# Patient Record
Sex: FEMALE | Race: BLACK OR AFRICAN AMERICAN | NOT HISPANIC OR LATINO | Employment: FULL TIME | URBAN - METROPOLITAN AREA
[De-identification: names, ages, dates, MRNs, and addresses within clinical notes are randomized per-mention and may not be internally consistent; named-entity substitution may affect disease eponyms.]

---

## 2023-05-09 LAB
ALANINE AMINOTRANSFERASE (SGPT) (U/L) IN SER/PLAS: 13 U/L (ref 7–45)
ALBUMIN (G/DL) IN SER/PLAS: 4 G/DL (ref 3.4–5)
ALKALINE PHOSPHATASE (U/L) IN SER/PLAS: 62 U/L (ref 33–110)
ANION GAP IN SER/PLAS: 11 MMOL/L (ref 10–20)
ASPARTATE AMINOTRANSFERASE (SGOT) (U/L) IN SER/PLAS: 13 U/L (ref 9–39)
BILIRUBIN DIRECT (MG/DL) IN SER/PLAS: 0.1 MG/DL (ref 0–0.3)
BILIRUBIN TOTAL (MG/DL) IN SER/PLAS: 0.4 MG/DL (ref 0–1.2)
CALCIDIOL (25 OH VITAMIN D3) (NG/ML) IN SER/PLAS: 35 NG/ML
CALCIUM (MG/DL) IN SER/PLAS: 9.5 MG/DL (ref 8.6–10.6)
CARBON DIOXIDE, TOTAL (MMOL/L) IN SER/PLAS: 29 MMOL/L (ref 21–32)
CHLORIDE (MMOL/L) IN SER/PLAS: 106 MMOL/L (ref 98–107)
CHOLESTEROL (MG/DL) IN SER/PLAS: 211 MG/DL (ref 0–199)
CHOLESTEROL IN HDL (MG/DL) IN SER/PLAS: 67.6 MG/DL
CHOLESTEROL/HDL RATIO: 3.1
CREATININE (MG/DL) IN SER/PLAS: 0.7 MG/DL (ref 0.5–1.05)
GFR FEMALE: >90 ML/MIN/1.73M2
GLUCOSE (MG/DL) IN SER/PLAS: 87 MG/DL (ref 74–99)
LDL: 119 MG/DL (ref 0–99)
POTASSIUM (MMOL/L) IN SER/PLAS: 4.6 MMOL/L (ref 3.5–5.3)
PROTEIN TOTAL: 6.8 G/DL (ref 6.4–8.2)
SODIUM (MMOL/L) IN SER/PLAS: 141 MMOL/L (ref 136–145)
THYROTROPIN (MIU/L) IN SER/PLAS BY DETECTION LIMIT <= 0.05 MIU/L: 1.08 MIU/L (ref 0.44–3.98)
TRIGLYCERIDE (MG/DL) IN SER/PLAS: 122 MG/DL (ref 0–149)
UREA NITROGEN (MG/DL) IN SER/PLAS: 12 MG/DL (ref 6–23)
VLDL: 24 MG/DL (ref 0–40)

## 2023-05-12 ENCOUNTER — TELEPHONE (OUTPATIENT)
Dept: PRIMARY CARE | Facility: CLINIC | Age: 33
End: 2023-05-12

## 2023-05-12 NOTE — TELEPHONE ENCOUNTER
----- Message from Laurita Ureña MD sent at 5/12/2023  3:09 PM EDT -----  All the labs were good except high cholesterol at 211. Please mail her a low fat diet.

## 2023-09-21 LAB
CHLAMYDIA TRACH., AMPLIFIED: NEGATIVE
N. GONORRHEA, AMPLIFIED: NEGATIVE
TRICHOMONAS VAGINALIS: NEGATIVE

## 2023-10-05 PROBLEM — A59.01 TRICHOMONAS VAGINITIS: Status: RESOLVED | Noted: 2023-10-05 | Resolved: 2023-10-05

## 2023-10-05 PROBLEM — R87.613 HGSIL ON PAP SMEAR OF CERVIX: Status: ACTIVE | Noted: 2023-10-05

## 2023-10-05 PROBLEM — J30.9 ALLERGIC RHINITIS: Status: ACTIVE | Noted: 2023-10-05

## 2023-10-05 PROBLEM — T83.32XA IUD THREADS LOST: Status: RESOLVED | Noted: 2023-10-05 | Resolved: 2023-10-05

## 2023-10-05 PROBLEM — I10 ESSENTIAL HYPERTENSION: Status: ACTIVE | Noted: 2023-10-05

## 2023-10-05 PROBLEM — F41.9 ANXIETY: Status: ACTIVE | Noted: 2023-10-05

## 2023-10-05 PROBLEM — N93.9 ABNORMAL UTERINE BLEEDING (AUB): Status: ACTIVE | Noted: 2023-10-05

## 2023-10-05 PROBLEM — E04.1 THYROID NODULE: Status: RESOLVED | Noted: 2023-10-05 | Resolved: 2023-10-05

## 2023-12-21 ENCOUNTER — OFFICE VISIT (OUTPATIENT)
Dept: PRIMARY CARE | Facility: CLINIC | Age: 33
End: 2023-12-21
Payer: COMMERCIAL

## 2023-12-21 VITALS
DIASTOLIC BLOOD PRESSURE: 90 MMHG | HEIGHT: 66 IN | BODY MASS INDEX: 45.48 KG/M2 | OXYGEN SATURATION: 100 % | TEMPERATURE: 97.2 F | HEART RATE: 92 BPM | SYSTOLIC BLOOD PRESSURE: 135 MMHG | WEIGHT: 283 LBS

## 2023-12-21 DIAGNOSIS — M54.50 CHRONIC BILATERAL LOW BACK PAIN WITHOUT SCIATICA: ICD-10-CM

## 2023-12-21 DIAGNOSIS — I10 ESSENTIAL HYPERTENSION: ICD-10-CM

## 2023-12-21 DIAGNOSIS — E66.01 CLASS 3 SEVERE OBESITY DUE TO EXCESS CALORIES WITH SERIOUS COMORBIDITY AND BODY MASS INDEX (BMI) OF 45.0 TO 49.9 IN ADULT (MULTI): Primary | ICD-10-CM

## 2023-12-21 DIAGNOSIS — G89.29 CHRONIC BILATERAL LOW BACK PAIN WITHOUT SCIATICA: ICD-10-CM

## 2023-12-21 DIAGNOSIS — J30.1 ALLERGIC RHINITIS DUE TO POLLEN, UNSPECIFIED SEASONALITY: ICD-10-CM

## 2023-12-21 PROBLEM — N93.9 ABNORMAL UTERINE BLEEDING (AUB): Status: RESOLVED | Noted: 2023-10-05 | Resolved: 2023-12-21

## 2023-12-21 PROBLEM — E66.813 CLASS 3 SEVERE OBESITY DUE TO EXCESS CALORIES WITH SERIOUS COMORBIDITY AND BODY MASS INDEX (BMI) OF 45.0 TO 49.9 IN ADULT: Status: ACTIVE | Noted: 2023-12-21

## 2023-12-21 PROBLEM — E55.9 VITAMIN D DEFICIENCY: Status: ACTIVE | Noted: 2023-12-21

## 2023-12-21 PROBLEM — R87.613 HGSIL ON PAP SMEAR OF CERVIX: Status: RESOLVED | Noted: 2023-10-05 | Resolved: 2023-12-21

## 2023-12-21 PROBLEM — Z97.5 IUD (INTRAUTERINE DEVICE) IN PLACE: Status: ACTIVE | Noted: 2023-12-21

## 2023-12-21 PROCEDURE — 3080F DIAST BP >= 90 MM HG: CPT | Performed by: PEDIATRICS

## 2023-12-21 PROCEDURE — 3075F SYST BP GE 130 - 139MM HG: CPT | Performed by: PEDIATRICS

## 2023-12-21 PROCEDURE — 3008F BODY MASS INDEX DOCD: CPT | Performed by: PEDIATRICS

## 2023-12-21 PROCEDURE — 1036F TOBACCO NON-USER: CPT | Performed by: PEDIATRICS

## 2023-12-21 PROCEDURE — 99214 OFFICE O/P EST MOD 30 MIN: CPT | Performed by: PEDIATRICS

## 2023-12-21 RX ORDER — AMLODIPINE BESYLATE 5 MG/1
5 TABLET ORAL DAILY
Qty: 90 TABLET | Refills: 0 | Status: SHIPPED | OUTPATIENT
Start: 2023-12-21

## 2023-12-21 RX ORDER — FLUTICASONE PROPIONATE 50 MCG
SPRAY, SUSPENSION (ML) NASAL
COMMUNITY
Start: 2021-03-16 | End: 2023-12-21 | Stop reason: SDUPTHER

## 2023-12-21 RX ORDER — AMLODIPINE BESYLATE 5 MG/1
5 TABLET ORAL DAILY
COMMUNITY
Start: 2019-03-21 | End: 2023-12-21 | Stop reason: SDUPTHER

## 2023-12-21 RX ORDER — FLUTICASONE PROPIONATE 50 MCG
SPRAY, SUSPENSION (ML) NASAL
Qty: 16 G | Refills: 0 | Status: SHIPPED | OUTPATIENT
Start: 2023-12-21

## 2023-12-21 RX ORDER — LORATADINE 10 MG/1
10 TABLET ORAL DAILY
Qty: 90 TABLET | Refills: 0 | Status: SHIPPED | OUTPATIENT
Start: 2023-12-21

## 2023-12-21 RX ORDER — LORATADINE 10 MG/1
1 TABLET ORAL DAILY
COMMUNITY
Start: 2021-03-16 | End: 2023-12-21 | Stop reason: SDUPTHER

## 2023-12-21 ASSESSMENT — PROMIS GLOBAL HEALTH SCALE
RATE_MENTAL_HEALTH: FAIR
RATE_AVERAGE_FATIGUE: MODERATE
RATE_GENERAL_HEALTH: FAIR
RATE_SOCIAL_SATISFACTION: FAIR
RATE_PHYSICAL_HEALTH: POOR
CARRYOUT_SOCIAL_ACTIVITIES: FAIR
CARRYOUT_PHYSICAL_ACTIVITIES: MODERATELY
EMOTIONAL_PROBLEMS: SOMETIMES
RATE_QUALITY_OF_LIFE: FAIR
RATE_AVERAGE_PAIN: 3

## 2023-12-21 NOTE — PATIENT INSTRUCTIONS
Avoid alcohol,  sausage, candy bar, chips, fried foods and restaurant food;   Cardio half hour to an hour daily; Carmen Cardenas exercise video  [email or web address]
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
## 2024-02-19 ENCOUNTER — APPOINTMENT (OUTPATIENT)
Dept: PRIMARY CARE | Facility: CLINIC | Age: 34
End: 2024-02-19
Payer: COMMERCIAL

## 2024-04-18 ENCOUNTER — APPOINTMENT (OUTPATIENT)
Dept: ENDOCRINOLOGY | Facility: CLINIC | Age: 34
End: 2024-04-18
Payer: COMMERCIAL

## 2024-05-02 ENCOUNTER — TELEMEDICINE (OUTPATIENT)
Dept: ENDOCRINOLOGY | Facility: CLINIC | Age: 34
End: 2024-05-02
Payer: COMMERCIAL

## 2024-05-02 DIAGNOSIS — E66.01 CLASS 3 SEVERE OBESITY DUE TO EXCESS CALORIES WITH SERIOUS COMORBIDITY AND BODY MASS INDEX (BMI) OF 45.0 TO 49.9 IN ADULT (MULTI): ICD-10-CM

## 2024-05-02 PROCEDURE — 1036F TOBACCO NON-USER: CPT | Performed by: NURSE PRACTITIONER

## 2024-05-02 PROCEDURE — 99204 OFFICE O/P NEW MOD 45 MIN: CPT | Performed by: NURSE PRACTITIONER

## 2024-05-02 PROCEDURE — 3008F BODY MASS INDEX DOCD: CPT | Performed by: NURSE PRACTITIONER

## 2024-05-02 NOTE — PATIENT INSTRUCTIONS
Get fasting labs      Based on labs, call insurance and ask coverage of Wegovy or Zepbound      Focus on meal replacement bar or shake for breakfast      Set up appt with Josy our dietician      Follow up 3 months virtually, 6 months in person

## 2024-05-02 NOTE — Clinical Note
Patient seen virtually today.  Can you please arrange 3 month weight follow up with me virtually and 6 month in person?  Can you please schedule weight visit with Josy?  Thanks.  Courtney

## 2024-05-02 NOTE — PROGRESS NOTES
Subjective   Marine Cobb is a 34 y.o. female who presents for medical evaluation of non-surgical weight management.       I am seeing this patient in consultation as per referral by Dr. Laurita Ureña for  evaluation of her obesity and associated medical problems.     Overweight: Weight started to be of concern late teens.  Rate of weight gain is described as gradual. Family history positive for obesity in the patient's mother, maternal grandmother.  She considers ideal weight to be 170 lbs. Weight at graduation from high school 179 lbs. Weight when  low 200s. Previous treatments include:  at the gym, calorie restriction, proper portion size.   Factors associated with weight gain: lack of time, portion sizes, and limited exercise        She reports weight 263 lbs at age 16-17  179 lbs at high school graduation after an intensive work out plan and diet plan  Normal weight recently had been in the mid 200s  Went back to college and now with kids,  has gained more weight   Now at highest weight 283 lbs  She is not sure she would like to proceed with bariatric surgery   Would like to lose on her own at this time     DIET:   Breakfast:  Nestle carnation breakfast, or whey powder protein shake   or skips  Lunch: consistent meal for her.  [place] or chicken salad or full course meal    Dinner: at home or eating out.  Protein, carb, veggie   Snacks: denies   Drinks: coffee (black with oat milk creamer), water, hot tea with honey or lemon juice    Alcohol: wine, cocktails - 5 drinks per week      EXERCISE:  Patient is doing day to day activity   Trying to get 10,000 steps per day - 5-7 days reaching this goal       QUALITY OF SLEEP PER NIGHT:  Patient get 5-7 hours of sleep on average per night.  Patient has not been diagnosed DECLAN.  Needs to get a sleep study.       LEVEL OF STRESS:  Mild- Moderate related to life, work      APPETITE CONTROL:   Controlled  Not always hungry when she is hungry she will eat      ROS  General: no fever, chills.  Weight see HPI  Skin: no rashes, pruritis or dry skin  Cardiac: denies chest pain, heart palpitations or orthopnea  Pulmonary: denies wheezing, productive cough or exertional dyspnea      Objective    Physical Exam  Unable to obtain blood pressure today due to virtual visit  General Appearance: Well appearing, alert, in no acute distress, well-hydrated, well nourished.  Affect: alert, cooperative         Current Outpatient Medications:     amLODIPine (Norvasc) 5 mg tablet, Take 1 tablet (5 mg) by mouth once daily., Disp: 90 tablet, Rfl: 0    fluticasone (Flonase) 50 mcg/actuation nasal spray, Take one daily, Disp: 16 g, Rfl: 0    levonorgestreL (LILETTA) 20.4 mcg/24 hrs (8 yrs) 52 mg intrauterine device, by intrauterine route., Disp: , Rfl:     loratadine (Claritin) 10 mg tablet, Take 1 tablet (10 mg) by mouth once daily., Disp: 90 tablet, Rfl: 0    Assessment/Plan   Morbid obesity BMI > 40:  HTN:   -BMI not at goal.  She is at her highest weight.  She reports to busy lifestyle working and raising the family.  She is not interested in bariatric surgery and would like to try medical weight loss.  She is interested in a once weekly GLP-1 or GLP-1/GIP.  Has pending labs and would evaluate A1c.  If elevated would proceed with medications for type 2 diabetes however if not would have to look into coverage for Wegovy and Zepbound.  Patient works for [employer] and these medications are not covered on their plan.  Could consider phentermine but patient reports she is not good at taking medication on a daily basis.  Also has high blood pressure and would need to make sure that this was in goal if we started phentermine.  Could consider Contrave but unclear if cost prohibitive  She is willing to meet with dietitian and not interested and that group class at this time.      Plan:   Get fasting labs    Based on labs, call insurance and ask coverage of Wegovy or Zepbound    Focus on  meal replacement bar or shake for breakfast    Set up appt with Josy our dietician    Follow up 3 months virtually, 6 months in person

## 2024-08-15 ENCOUNTER — APPOINTMENT (OUTPATIENT)
Dept: ENDOCRINOLOGY | Facility: CLINIC | Age: 34
End: 2024-08-15
Payer: COMMERCIAL

## 2024-08-15 VITALS — BODY MASS INDEX: 41.28 KG/M2 | WEIGHT: 255.73 LBS

## 2024-08-15 DIAGNOSIS — E66.01 OBESITY, CLASS III, BMI 40-49.9 (MORBID OBESITY) (MULTI): Primary | ICD-10-CM

## 2024-08-15 PROCEDURE — 99213 OFFICE O/P EST LOW 20 MIN: CPT | Performed by: NURSE PRACTITIONER

## 2024-08-15 PROCEDURE — 1036F TOBACCO NON-USER: CPT | Performed by: NURSE PRACTITIONER

## 2024-08-15 NOTE — PROGRESS NOTES
Subjective   Marine Cobb is a 34 y.o. female who presents for a follow up of non-surgical weight management.       Overweight: Weight started to be of concern late teens.  Rate of weight gain is described as gradual. Family history positive for obesity in the patient's mother, maternal grandmother.  She considers ideal weight to be 170 lbs. Weight at graduation from high school 179 lbs. Weight when  low 200s. Previous treatments include:  at the gym, calorie restriction, proper portion size.   Factors associated with weight gain: lack of time, portion sizes, and limited exercise        She reports weight 263 lbs at age 16-17  179 lbs at high school graduation after an intensive work out plan and diet plan  Normal weight recently had been in the mid 200s  Went back to college and now with kids,  has gained more weight   Highest weight 283 lbs around 12/2023  She is not sure she would like to proceed with bariatric surgery   Would like to lose on her own at this time     Since last visit, she is walking more   Job is requiring her to walk more    She reports weight 116.7 kg on home scale  = 256.74 lbs.    Has been trying to get in something for breakfast  If she doesn't will eat mid morning snack like cucumber salad or cucumber, tomato, mozzarella salad.    Did not have a chance to meet with dietician       DIET:   Trying to get a protein shake for breakfast  May still skip  Trying to do salads for lunch       EXERCISE:  Patient is doing day to day activity   Trying to get 10,000 steps per day   She is walking more      QUALITY OF SLEEP PER NIGHT:  She is working nights over the summer  Has been getting around 6 hours of sleep.    Has not had time to get sleep study       LEVEL OF STRESS:  Stress improved.       APPETITE CONTROL:   Controlled     ROS  General: no fever, chills.  Weight see HPI  Skin: no rashes, pruritis or dry skin  Cardiac: denies chest pain, heart palpitations or  orthopnea  Pulmonary: denies wheezing, productive cough or exertional dyspnea      Objective    Physical Exam  Unable to obtain blood pressure today due to virtual visit  General Appearance: Well appearing, alert, in no acute distress, well-hydrated, well nourished.  Affect: alert, cooperative       Current Outpatient Medications:     amLODIPine (Norvasc) 5 mg tablet, Take 1 tablet (5 mg) by mouth once daily., Disp: 90 tablet, Rfl: 0    fluticasone (Flonase) 50 mcg/actuation nasal spray, Take one daily, Disp: 16 g, Rfl: 0    levonorgestreL (LILETTA) 20.4 mcg/24 hrs (8 yrs) 52 mg intrauterine device, by intrauterine route., Disp: , Rfl:     loratadine (Claritin) 10 mg tablet, Take 1 tablet (10 mg) by mouth once daily., Disp: 90 tablet, Rfl: 0    Assessment/Plan   Morbid obesity BMI > 40:  HTN:   -BMI not at goal.  She is exercising more.  patient has been unable to meet with dietitian due to schedule.  She is in transition of jobs from [employer] to .  She did not call her insurance to see if she has coverage of weight loss medications.  Discussed  plan does not cover these medications.  She does report weight loss of about 30 pounds.  Willing to meet with dietitian and will try to get rescheduled.    Plan:   Schedule with dietitian  Continue meal plan and exercise  Follow-up in November as planned

## 2024-09-12 ENCOUNTER — LAB (OUTPATIENT)
Dept: LAB | Facility: LAB | Age: 34
End: 2024-09-12
Payer: COMMERCIAL

## 2024-09-12 DIAGNOSIS — J30.1 ALLERGIC RHINITIS DUE TO POLLEN, UNSPECIFIED SEASONALITY: ICD-10-CM

## 2024-09-12 DIAGNOSIS — E66.01 CLASS 3 SEVERE OBESITY DUE TO EXCESS CALORIES WITH SERIOUS COMORBIDITY AND BODY MASS INDEX (BMI) OF 45.0 TO 49.9 IN ADULT (MULTI): ICD-10-CM

## 2024-09-12 LAB
25(OH)D3 SERPL-MCNC: 16 NG/ML (ref 30–100)
ALBUMIN SERPL BCP-MCNC: 3.7 G/DL (ref 3.4–5)
ALP SERPL-CCNC: 55 U/L (ref 33–110)
ALT SERPL W P-5'-P-CCNC: 22 U/L (ref 7–45)
ANION GAP SERPL CALC-SCNC: 12 MMOL/L (ref 10–20)
AST SERPL W P-5'-P-CCNC: 21 U/L (ref 9–39)
BILIRUB SERPL-MCNC: 0.3 MG/DL (ref 0–1.2)
BUN SERPL-MCNC: 8 MG/DL (ref 6–23)
CALCIUM SERPL-MCNC: 9.2 MG/DL (ref 8.6–10.3)
CHLORIDE SERPL-SCNC: 101 MMOL/L (ref 98–107)
CHOLEST SERPL-MCNC: 193 MG/DL (ref 0–199)
CHOLESTEROL/HDL RATIO: 2.7
CO2 SERPL-SCNC: 27 MMOL/L (ref 21–32)
CREAT SERPL-MCNC: 0.73 MG/DL (ref 0.5–1.05)
EGFRCR SERPLBLD CKD-EPI 2021: >90 ML/MIN/1.73M*2
ERYTHROCYTE [DISTWIDTH] IN BLOOD BY AUTOMATED COUNT: 13.2 % (ref 11.5–14.5)
EST. AVERAGE GLUCOSE BLD GHB EST-MCNC: 108 MG/DL
GLUCOSE SERPL-MCNC: 87 MG/DL (ref 74–99)
HBA1C MFR BLD: 5.4 %
HCT VFR BLD AUTO: 38.9 % (ref 36–46)
HDLC SERPL-MCNC: 71.1 MG/DL
HGB BLD-MCNC: 12.8 G/DL (ref 12–16)
LDLC SERPL CALC-MCNC: 105 MG/DL
MCH RBC QN AUTO: 31.5 PG (ref 26–34)
MCHC RBC AUTO-ENTMCNC: 32.9 G/DL (ref 32–36)
MCV RBC AUTO: 96 FL (ref 80–100)
NON HDL CHOLESTEROL: 122 MG/DL (ref 0–149)
NRBC BLD-RTO: 0 /100 WBCS (ref 0–0)
PLATELET # BLD AUTO: 310 X10*3/UL (ref 150–450)
POTASSIUM SERPL-SCNC: 4.9 MMOL/L (ref 3.5–5.3)
PROT SERPL-MCNC: 6.8 G/DL (ref 6.4–8.2)
RBC # BLD AUTO: 4.06 X10*6/UL (ref 4–5.2)
SODIUM SERPL-SCNC: 135 MMOL/L (ref 136–145)
TRIGL SERPL-MCNC: 84 MG/DL (ref 0–149)
TSH SERPL-ACNC: 1.09 MIU/L (ref 0.44–3.98)
VLDL: 17 MG/DL (ref 0–40)
WBC # BLD AUTO: 5.8 X10*3/UL (ref 4.4–11.3)

## 2024-09-12 PROCEDURE — 85027 COMPLETE CBC AUTOMATED: CPT

## 2024-09-12 PROCEDURE — 80053 COMPREHEN METABOLIC PANEL: CPT

## 2024-09-12 PROCEDURE — 82306 VITAMIN D 25 HYDROXY: CPT

## 2024-09-12 PROCEDURE — 80061 LIPID PANEL: CPT

## 2024-09-12 PROCEDURE — 83036 HEMOGLOBIN GLYCOSYLATED A1C: CPT

## 2024-09-12 PROCEDURE — 84443 ASSAY THYROID STIM HORMONE: CPT

## 2024-09-12 PROCEDURE — 36415 COLL VENOUS BLD VENIPUNCTURE: CPT

## 2024-09-20 ENCOUNTER — OFFICE VISIT (OUTPATIENT)
Dept: PRIMARY CARE | Facility: CLINIC | Age: 34
End: 2024-09-20
Payer: COMMERCIAL

## 2024-09-20 VITALS
SYSTOLIC BLOOD PRESSURE: 112 MMHG | HEART RATE: 83 BPM | BODY MASS INDEX: 44.71 KG/M2 | OXYGEN SATURATION: 98 % | DIASTOLIC BLOOD PRESSURE: 80 MMHG | WEIGHT: 277 LBS

## 2024-09-20 DIAGNOSIS — M54.50 CHRONIC BILATERAL LOW BACK PAIN WITHOUT SCIATICA: ICD-10-CM

## 2024-09-20 DIAGNOSIS — I10 ESSENTIAL HYPERTENSION: Primary | ICD-10-CM

## 2024-09-20 DIAGNOSIS — E66.01 CLASS 3 SEVERE OBESITY DUE TO EXCESS CALORIES WITH SERIOUS COMORBIDITY AND BODY MASS INDEX (BMI) OF 40.0 TO 44.9 IN ADULT: ICD-10-CM

## 2024-09-20 DIAGNOSIS — J30.1 ALLERGIC RHINITIS DUE TO POLLEN, UNSPECIFIED SEASONALITY: ICD-10-CM

## 2024-09-20 DIAGNOSIS — E55.9 VITAMIN D DEFICIENCY: ICD-10-CM

## 2024-09-20 DIAGNOSIS — G89.29 CHRONIC BILATERAL LOW BACK PAIN WITHOUT SCIATICA: ICD-10-CM

## 2024-09-20 DIAGNOSIS — F41.9 ANXIETY: ICD-10-CM

## 2024-09-20 DIAGNOSIS — J01.00 ACUTE NON-RECURRENT MAXILLARY SINUSITIS: ICD-10-CM

## 2024-09-20 PROBLEM — E66.813 OBESITY, CLASS III, BMI 40-49.9 (MORBID OBESITY): Status: RESOLVED | Noted: 2023-12-21 | Resolved: 2024-09-20

## 2024-09-20 PROCEDURE — 99214 OFFICE O/P EST MOD 30 MIN: CPT | Performed by: PEDIATRICS

## 2024-09-20 PROCEDURE — 1036F TOBACCO NON-USER: CPT | Performed by: PEDIATRICS

## 2024-09-20 PROCEDURE — 3079F DIAST BP 80-89 MM HG: CPT | Performed by: PEDIATRICS

## 2024-09-20 PROCEDURE — 3074F SYST BP LT 130 MM HG: CPT | Performed by: PEDIATRICS

## 2024-09-20 RX ORDER — FLUTICASONE PROPIONATE 50 MCG
SPRAY, SUSPENSION (ML) NASAL
Qty: 16 G | Refills: 11 | Status: SHIPPED | OUTPATIENT
Start: 2024-09-20

## 2024-09-20 RX ORDER — CYCLOBENZAPRINE HCL 10 MG
10 TABLET ORAL NIGHTLY PRN
Qty: 30 TABLET | Refills: 0 | Status: SHIPPED | OUTPATIENT
Start: 2024-09-20 | End: 2024-11-19

## 2024-09-20 RX ORDER — AMOXICILLIN 875 MG/1
875 TABLET, FILM COATED ORAL 2 TIMES DAILY
Qty: 20 TABLET | Refills: 0 | Status: SHIPPED | OUTPATIENT
Start: 2024-09-20 | End: 2024-09-30

## 2024-09-20 RX ORDER — LORATADINE 10 MG/1
10 TABLET ORAL DAILY
Qty: 90 TABLET | Refills: 11 | Status: SHIPPED | OUTPATIENT
Start: 2024-09-20

## 2024-09-20 NOTE — PROGRESS NOTES
Subjective   Patient ID: Marine Cobb is a 34 y.o. female who presents for nasal congestion and obesity.    HPI   Eating healthier; snacking on fruit; occ chips; exercise  Saw GYN a year ago  Review of Systems  Nose stuffed up for a month despite Netti pot  Objective   /80   Pulse 83   Wt 126 kg (277 lb)   SpO2 98%   BMI 44.71 kg/m²     Physical Exam  Tender maxillary sinuses  Lungs clear  Heart RRR  Abd soft  No edema  Assessment/Plan   Problem List Items Addressed This Visit             ICD-10-CM    Allergic rhinitis J30.9    Relevant Medications    fluticasone (Flonase) 50 mcg/actuation nasal spray    loratadine (Claritin) 10 mg tablet    Anxiety F41.9    Essential hypertension - Primary I10     BP good off meds         Class 3 severe obesity due to excess calories with serious comorbidity and body mass index (BMI) of 40.0 to 44.9 in adult (Multi) E66.01, Z68.41     Seeing someone for weight loss         Vitamin D deficiency E55.9    Chronic bilateral low back pain without sciatica M54.50, G89.29     No time for PT         Relevant Medications    cyclobenzaprine (Flexeril) 10 mg tablet    Acute non-recurrent maxillary sinusitis J01.00    Relevant Medications    amoxicillin (Amoxil) 875 mg tablet          
fair balance

## 2024-09-20 NOTE — PATIENT INSTRUCTIONS
Egg white omelet, toast and  jelly or cereal for breakfast  See [facility]  Take 1000 units of vitamin D daily  Return next year

## 2024-09-23 DIAGNOSIS — J30.1 ALLERGIC RHINITIS DUE TO POLLEN, UNSPECIFIED SEASONALITY: ICD-10-CM

## 2024-09-23 RX ORDER — FLUTICASONE PROPIONATE 50 MCG
SPRAY, SUSPENSION (ML) NASAL
Qty: 16 G | Refills: 11 | Status: SHIPPED | OUTPATIENT
Start: 2024-09-23

## 2024-11-06 ENCOUNTER — APPOINTMENT (OUTPATIENT)
Dept: ENDOCRINOLOGY | Facility: CLINIC | Age: 34
End: 2024-11-06
Payer: COMMERCIAL